# Patient Record
Sex: FEMALE | Race: WHITE | NOT HISPANIC OR LATINO | Employment: OTHER | URBAN - NONMETROPOLITAN AREA
[De-identification: names, ages, dates, MRNs, and addresses within clinical notes are randomized per-mention and may not be internally consistent; named-entity substitution may affect disease eponyms.]

---

## 2017-02-01 DIAGNOSIS — E78.5 HYPERLIPEMIA: ICD-10-CM

## 2017-02-01 RX ORDER — ATORVASTATIN CALCIUM 40 MG/1
TABLET, FILM COATED ORAL
Qty: 90 TABLET | Refills: 0 | Status: SHIPPED | OUTPATIENT
Start: 2017-02-01 | End: 2017-02-21 | Stop reason: ALTCHOICE

## 2017-02-03 ENCOUNTER — OFFICE VISIT (OUTPATIENT)
Dept: CARDIOLOGY | Facility: CLINIC | Age: 57
End: 2017-02-03

## 2017-02-03 VITALS
HEART RATE: 78 BPM | BODY MASS INDEX: 27.52 KG/M2 | WEIGHT: 161.2 LBS | SYSTOLIC BLOOD PRESSURE: 132 MMHG | HEIGHT: 64 IN | DIASTOLIC BLOOD PRESSURE: 68 MMHG

## 2017-02-03 DIAGNOSIS — R00.2 PALPITATIONS: ICD-10-CM

## 2017-02-03 DIAGNOSIS — I25.10 CORONARY ARTERY DISEASE INVOLVING NATIVE CORONARY ARTERY OF NATIVE HEART WITHOUT ANGINA PECTORIS: Primary | ICD-10-CM

## 2017-02-03 DIAGNOSIS — Z72.0 TOBACCO ABUSE: ICD-10-CM

## 2017-02-03 DIAGNOSIS — E78.5 HYPERLIPIDEMIA LDL GOAL <70: ICD-10-CM

## 2017-02-03 DIAGNOSIS — R06.09 DYSPNEA ON EXERTION: ICD-10-CM

## 2017-02-03 PROCEDURE — 99213 OFFICE O/P EST LOW 20 MIN: CPT | Performed by: NURSE PRACTITIONER

## 2017-02-03 RX ORDER — PREGABALIN 200 MG/1
200 CAPSULE ORAL DAILY
COMMUNITY

## 2017-02-03 RX ORDER — HYDROCODONE BITARTRATE AND ACETAMINOPHEN 10; 325 MG/1; MG/1
1 TABLET ORAL EVERY 4 HOURS PRN
COMMUNITY

## 2017-02-03 RX ORDER — ASPIRIN 81 MG/1
81 TABLET ORAL DAILY
COMMUNITY
End: 2021-03-24 | Stop reason: SDUPTHER

## 2017-02-03 NOTE — ASSESSMENT & PLAN NOTE
· Schedule echocardiogram at [facility]  · Stop all caffeine use  · If echocardiogram is normal and patient still exhibiting symptoms recommend low-dose beta blocker.
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
## 2017-02-17 ENCOUNTER — LAB (OUTPATIENT)
Dept: LAB | Facility: HOSPITAL | Age: 57
End: 2017-02-17

## 2017-02-17 ENCOUNTER — OFFICE VISIT (OUTPATIENT)
Dept: CARDIOLOGY | Facility: CLINIC | Age: 57
End: 2017-02-17

## 2017-02-17 ENCOUNTER — HOSPITAL ENCOUNTER (OUTPATIENT)
Dept: CARDIOLOGY | Facility: HOSPITAL | Age: 57
Discharge: HOME | End: 2017-02-17
Admitting: NURSE PRACTITIONER

## 2017-02-17 VITALS
HEART RATE: 74 BPM | SYSTOLIC BLOOD PRESSURE: 124 MMHG | HEIGHT: 64 IN | BODY MASS INDEX: 26.98 KG/M2 | WEIGHT: 158 LBS | DIASTOLIC BLOOD PRESSURE: 80 MMHG

## 2017-02-17 DIAGNOSIS — E78.5 HYPERLIPEMIA: ICD-10-CM

## 2017-02-17 DIAGNOSIS — Z72.0 TOBACCO ABUSE: ICD-10-CM

## 2017-02-17 DIAGNOSIS — R00.2 PALPITATIONS: ICD-10-CM

## 2017-02-17 DIAGNOSIS — E78.5 HYPERLIPIDEMIA LDL GOAL <70: ICD-10-CM

## 2017-02-17 DIAGNOSIS — R06.09 DYSPNEA ON EXERTION: ICD-10-CM

## 2017-02-17 DIAGNOSIS — I25.10 CORONARY ARTERY DISEASE INVOLVING NATIVE CORONARY ARTERY OF NATIVE HEART WITHOUT ANGINA PECTORIS: Primary | ICD-10-CM

## 2017-02-17 LAB
ALBUMIN SERPL-MCNC: 4.5 G/DL (ref 3.2–4.8)
ALBUMIN/GLOB SERPL: 1.4 G/DL (ref 1.5–2.5)
ALP SERPL-CCNC: 82 U/L (ref 25–100)
ALT SERPL W P-5'-P-CCNC: 18 U/L (ref 7–40)
ANION GAP SERPL CALCULATED.3IONS-SCNC: 6 MMOL/L (ref 3–11)
ARTICHOKE IGE QN: 142 MG/DL (ref 0–130)
AST SERPL-CCNC: 23 U/L (ref 0–33)
BH CV ECHO MEAS - AO ROOT AREA (BSA CORRECTED): 1.6
BH CV ECHO MEAS - AO ROOT AREA: 6.6 CM^2
BH CV ECHO MEAS - AO ROOT DIAM: 2.9 CM
BH CV ECHO MEAS - BSA(HAYCOCK): 1.8 M^2
BH CV ECHO MEAS - BSA: 1.8 M^2
BH CV ECHO MEAS - BZI_BMI: 27.5 KILOGRAMS/M^2
BH CV ECHO MEAS - BZI_METRIC_HEIGHT: 162.6 CM
BH CV ECHO MEAS - BZI_METRIC_WEIGHT: 72.6 KG
BH CV ECHO MEAS - CONTRAST EF 4CH: 51.4 ML/M^2
BH CV ECHO MEAS - EDV(CUBED): 82.3 ML
BH CV ECHO MEAS - EDV(MOD-SP4): 74 ML
BH CV ECHO MEAS - EDV(TEICH): 85.4 ML
BH CV ECHO MEAS - EF(CUBED): 68.2 %
BH CV ECHO MEAS - EF(MOD-SP4): 51.4 %
BH CV ECHO MEAS - EF(TEICH): 60 %
BH CV ECHO MEAS - ESV(CUBED): 26.2 ML
BH CV ECHO MEAS - ESV(MOD-SP4): 36 ML
BH CV ECHO MEAS - ESV(TEICH): 34.2 ML
BH CV ECHO MEAS - FS: 31.7 %
BH CV ECHO MEAS - IVS/LVPW: 1
BH CV ECHO MEAS - IVSD: 1 CM
BH CV ECHO MEAS - LA DIMENSION: 3.3 CM
BH CV ECHO MEAS - LA/AO: 1.1
BH CV ECHO MEAS - LV DIASTOLIC VOL/BSA (35-75): 41.6 ML/M^2
BH CV ECHO MEAS - LV MASS(C)D: 151.2 GRAMS
BH CV ECHO MEAS - LV MASS(C)DI: 85 GRAMS/M^2
BH CV ECHO MEAS - LV MAX PG: 3.8 MMHG
BH CV ECHO MEAS - LV MEAN PG: 2 MMHG
BH CV ECHO MEAS - LV SYSTOLIC VOL/BSA (12-30): 20.2 ML/M^2
BH CV ECHO MEAS - LV V1 MAX: 97 CM/SEC
BH CV ECHO MEAS - LV V1 MEAN: 55.9 CM/SEC
BH CV ECHO MEAS - LV V1 VTI: 20.5 CM
BH CV ECHO MEAS - LVIDD: 4.4 CM
BH CV ECHO MEAS - LVIDS: 3 CM
BH CV ECHO MEAS - LVLD AP4: 7.1 CM
BH CV ECHO MEAS - LVLS AP4: 5.8 CM
BH CV ECHO MEAS - LVPWD: 1 CM
BH CV ECHO MEAS - PA ACC SLOPE: 779 CM/SEC^2
BH CV ECHO MEAS - PA ACC TIME: 0.11 SEC
BH CV ECHO MEAS - PA PR(ACCEL): 29.1 MMHG
BH CV ECHO MEAS - PI END-D VEL: 0.49 CM/SEC
BH CV ECHO MEAS - RAP SYSTOLE: 8 MMHG
BH CV ECHO MEAS - RVDD: 2.9 CM
BH CV ECHO MEAS - RVSP: 33.8 MMHG
BH CV ECHO MEAS - SI(CUBED): 31.5 ML/M^2
BH CV ECHO MEAS - SI(MOD-SP4): 21.4 ML/M^2
BH CV ECHO MEAS - SI(TEICH): 28.8 ML/M^2
BH CV ECHO MEAS - SV(CUBED): 56.1 ML
BH CV ECHO MEAS - SV(MOD-SP4): 38 ML
BH CV ECHO MEAS - SV(TEICH): 51.2 ML
BH CV ECHO MEAS - TR MAX VEL: 254 CM/SEC
BILIRUB SERPL-MCNC: 0.4 MG/DL (ref 0.3–1.2)
BUN BLD-MCNC: 8 MG/DL (ref 9–23)
BUN/CREAT SERPL: 11.4 (ref 7–25)
CALCIUM SPEC-SCNC: 10.1 MG/DL (ref 8.7–10.4)
CHLORIDE SERPL-SCNC: 101 MMOL/L (ref 99–109)
CHOLEST SERPL-MCNC: 196 MG/DL (ref 0–200)
CO2 SERPL-SCNC: 30 MMOL/L (ref 20–31)
CREAT BLD-MCNC: 0.7 MG/DL (ref 0.6–1.3)
DEPRECATED FTI SERPL-MCNC: 3.4 TBI
DEPRECATED RDW RBC AUTO: 43.2 FL (ref 37–54)
ERYTHROCYTE [DISTWIDTH] IN BLOOD BY AUTOMATED COUNT: 12.9 % (ref 11.3–14.5)
GFR SERPL CREATININE-BSD FRML MDRD: 87 ML/MIN/1.73
GLOBULIN UR ELPH-MCNC: 3.2 GM/DL
GLUCOSE BLD-MCNC: 86 MG/DL (ref 70–100)
HCT VFR BLD AUTO: 43.2 % (ref 34.5–44)
HDLC SERPL-MCNC: 46 MG/DL (ref 40–60)
HGB BLD-MCNC: 14.6 G/DL (ref 11.5–15.5)
LV EF 2D ECHO EST: 60 %
MCH RBC QN AUTO: 31.3 PG (ref 27–31)
MCHC RBC AUTO-ENTMCNC: 33.8 G/DL (ref 32–36)
MCV RBC AUTO: 92.5 FL (ref 80–99)
PLATELET # BLD AUTO: 278 10*3/MM3 (ref 150–450)
PMV BLD AUTO: 9.8 FL (ref 6–12)
POTASSIUM BLD-SCNC: 4.1 MMOL/L (ref 3.5–5.5)
PROT SERPL-MCNC: 7.7 G/DL (ref 5.7–8.2)
RBC # BLD AUTO: 4.67 10*6/MM3 (ref 3.89–5.14)
SODIUM BLD-SCNC: 137 MMOL/L (ref 132–146)
T3RU NFR SERPL: 29.1 % (ref 23–37)
T4 SERPL-MCNC: 11.7 MCG/DL (ref 4.7–11.4)
TRIGL SERPL-MCNC: 151 MG/DL (ref 0–150)
TSH SERPL DL<=0.05 MIU/L-ACNC: 2.46 MIU/ML (ref 0.35–5.35)
WBC NRBC COR # BLD: 7.69 10*3/MM3 (ref 3.5–10.8)

## 2017-02-17 PROCEDURE — 84436 ASSAY OF TOTAL THYROXINE: CPT | Performed by: INTERNAL MEDICINE

## 2017-02-17 PROCEDURE — 93306 TTE W/DOPPLER COMPLETE: CPT | Performed by: INTERNAL MEDICINE

## 2017-02-17 PROCEDURE — 0296T PR EXT ECG > 48HR TO 21 DAY RCRD W/CONECT INTL RCRD: CPT | Performed by: INTERNAL MEDICINE

## 2017-02-17 PROCEDURE — 80053 COMPREHEN METABOLIC PANEL: CPT | Performed by: INTERNAL MEDICINE

## 2017-02-17 PROCEDURE — 84443 ASSAY THYROID STIM HORMONE: CPT | Performed by: INTERNAL MEDICINE

## 2017-02-17 PROCEDURE — 93306 TTE W/DOPPLER COMPLETE: CPT

## 2017-02-17 PROCEDURE — 85027 COMPLETE CBC AUTOMATED: CPT | Performed by: INTERNAL MEDICINE

## 2017-02-17 PROCEDURE — 99214 OFFICE O/P EST MOD 30 MIN: CPT | Performed by: INTERNAL MEDICINE

## 2017-02-17 PROCEDURE — 80061 LIPID PANEL: CPT | Performed by: INTERNAL MEDICINE

## 2017-02-17 PROCEDURE — 36415 COLL VENOUS BLD VENIPUNCTURE: CPT

## 2017-02-17 PROCEDURE — 84479 ASSAY OF THYROID (T3 OR T4): CPT | Performed by: INTERNAL MEDICINE

## 2017-02-17 NOTE — ASSESSMENT & PLAN NOTE
Palpitations with a structurally normal heart are likely benign.  The patient continues to have episodes of tachycardia palpitations which caused her significant shortness of breath and near-syncope.  This occurs every 4-6 days and therefore extended monitoring will be needed to capture one of these episodes.      · 14 day Holter monitor  · TSH, CBC, CMP today

## 2017-02-17 NOTE — PROGRESS NOTES
"Encounter Date:02/17/2017    Patient ID: Damaris Severino is a 56 y.o. female who resides in [place], [place].    CC/Reason for visit:  Shortness of Breath (2 week follow up with echo); Coronary Artery Disease; and Palpitations          Damaris eSverino presents today as a 2 week follow up with an echo. Patient had been experiencing increased shortness of breath. She has a history of coronary artery disease, hyperlipidemia, and palpitations. Since last visit, patient has been feeling well overall. She does still get some episodes of palpitations, with the last one occurring this morning. She notes it lasted a few minutes. She also notes getting spells of \"knocking\" on her chest that causes her to get short of breath, almost to the point of syncope.    Review of Systems   Constitution: Positive for diaphoresis, weakness, malaise/fatigue and weight gain.   HENT: Positive for headaches and tinnitus.    Eyes: Positive for blurred vision. Negative for vision loss in left eye and vision loss in right eye.   Cardiovascular: Positive for irregular heartbeat and orthopnea. Negative for chest pain, dyspnea on exertion, near-syncope, palpitations, paroxysmal nocturnal dyspnea and syncope.   Respiratory: Positive for shortness of breath.    Hematologic/Lymphatic:        Swollen lymph glands   Skin: Positive for dry skin.   Musculoskeletal: Positive for back pain and muscle cramps. Negative for myalgias.   Gastrointestinal: Positive for constipation.   Neurological: Positive for excessive daytime sleepiness and dizziness. Negative for brief paralysis, focal weakness, numbness and paresthesias.   All other systems reviewed and are negative.      The patient's past medical, social, and family history reviewed in the patient's electronic medical record.    Allergies  Review of patient's allergies indicates no known allergies.    Outpatient Prescriptions Marked as Taking for the 2/17/17 encounter (Office Visit) with Heath Sandoval MD " "  Medication Sig Dispense Refill   • aspirin 81 MG EC tablet Take 81 mg by mouth Daily.     • atorvastatin (LIPITOR) 40 MG tablet TAKE 1 TABLET DAILY (NEED LAB WORK FOR ADDITIONAL REFILLS) 90 tablet 0   • HYDROcodone-acetaminophen (NORCO)  MG per tablet Take 1 tablet by mouth Every 4 (Four) Hours As Needed for moderate pain (4-6).     • Morphine Sulfate (MS CONTIN PO) Take 75 mg by mouth 2 (Two) Times a Day.     • pregabalin (LYRICA) 200 MG capsule Take 200 mg by mouth Daily.           Blood pressure 124/80, pulse 74, height 64\" (162.6 cm), weight 158 lb (71.7 kg).  Body mass index is 27.12 kg/(m^2).    Physical Exam   Constitutional: She is oriented to person, place, and time. She appears well-developed and well-nourished.   HENT:   Head: Normocephalic and atraumatic.   Eyes: Pupils are equal, round, and reactive to light. No scleral icterus.   Neck: No JVD present. Carotid bruit is not present. No thyromegaly present.   Cardiovascular: Normal rate, regular rhythm, S1 normal and S2 normal.  Exam reveals no gallop.    No murmur heard.  Pulmonary/Chest: Effort normal and breath sounds normal.   Abdominal: Soft. There is no tenderness.   Neurological: She is alert and oriented to person, place, and time.   Skin: Skin is warm and dry. No cyanosis. Nails show no clubbing.   Psychiatric: She has a normal mood and affect. Her behavior is normal.       Data Review:     Lab Results   Component Value Date    CHLPL 205 (H) 05/11/2016    TRIG 149 05/11/2016    HDL 45 (L) 05/11/2016    AST 25 05/11/2016    ALT 20 05/11/2016     Lab Results   Component Value Date    GLUCOSE 88 05/11/2016    BUN 9 05/11/2016    CREATININE 0.67 05/11/2016    BCR 13.7 05/11/2016    CO2 32.5 (H) 05/11/2016    CALCIUM 10.0 05/11/2016    ALBUMIN 4.8 05/11/2016    LABIL2 1.5 05/11/2016    AST 25 05/11/2016    ALT 20 05/11/2016       Procedures         Problem List Items Addressed This Visit        Cardiology Problems    Palpitations    Overview "     · Holter monitor (06/2016): Sinus rhythm, rare PAC's         Current Assessment & Plan     Palpitations with a structurally normal heart are likely benign.  The patient continues to have episodes of tachycardia palpitations which caused her significant shortness of breath and near-syncope.  This occurs every 4-6 days and therefore extended monitoring will be needed to capture one of these episodes.      · 14 day Holter monitor  · TSH, CBC, CMP today         Relevant Orders    CBC (No Diff)    Thyroid Panel With TSH    Holter / Event ZIO Patch    Coronary artery disease involving native coronary artery of native heart without angina pectoris - Primary    Overview     · Cardiac catheterization by Dr. Ordonez (11/20/2003): BMS to OM1.  · Chest pain symptoms ultimately resolved with cholecystectomy.   · Recurrent atypical chest pain symptoms, Spring 2016.  · Nuclear stress test (06/29/2016): Low risk study with no evidence of ishemia         Current Assessment & Plan     · Asymptomatic  · Continue aspirin, statin therapy         Hyperlipidemia LDL goal <70    Overview     · High intensity statin therapy indicated given the presence of coronary artery disease         Current Assessment & Plan     · Continue Lipitor 40 mg daily  · Obtain CMP and lipid profile the day            Other    Tobacco abuse    Current Assessment & Plan     · Tobacco cessation recommended                    · 14 day Holter monitor  · CMP, lipids, TSH, CBC today  · We'll be in contact with patient regarding results of monitor  · Follow up in 8 months, or sooner if needed.    Heath Sandoval MD  2/17/2017    Scribed for Heath Sandoval MD by Barron Soto. 2/17/2017  12:44 PM

## 2017-02-21 ENCOUNTER — TELEPHONE (OUTPATIENT)
Dept: CARDIOLOGY | Facility: CLINIC | Age: 57
End: 2017-02-21

## 2017-02-21 DIAGNOSIS — E78.5 HYPERLIPIDEMIA LDL GOAL <70: Primary | ICD-10-CM

## 2017-02-21 RX ORDER — ROSUVASTATIN CALCIUM 40 MG/1
40 TABLET, COATED ORAL DAILY
Qty: 90 TABLET | Refills: 1 | Status: SHIPPED | OUTPATIENT
Start: 2017-02-21 | End: 2021-03-24

## 2017-02-21 NOTE — TELEPHONE ENCOUNTER
Called patient to go over her lab results. Her LDL was elevated. Patient verbalized understanding of starting Crestor 40 mg but would like to start in 1 month. She said she just filled her atorvastatin and would like to finish those up. Script sent. Lab orders mailed.

## 2017-03-14 ENCOUNTER — OFFICE VISIT (OUTPATIENT)
Dept: CARDIOLOGY | Facility: CLINIC | Age: 57
End: 2017-03-14

## 2017-03-14 DIAGNOSIS — R00.2 PALPITATIONS: ICD-10-CM

## 2017-03-14 PROCEDURE — 93272 ECG/REVIEW INTERPRET ONLY: CPT | Performed by: INTERNAL MEDICINE

## 2017-03-14 PROCEDURE — 0298T ZIO PATCH: CPT | Performed by: INTERNAL MEDICINE

## 2017-05-02 DIAGNOSIS — E78.5 HYPERLIPEMIA: ICD-10-CM

## 2017-05-02 RX ORDER — ATORVASTATIN CALCIUM 40 MG/1
TABLET, FILM COATED ORAL
Qty: 90 TABLET | OUTPATIENT
Start: 2017-05-02

## 2021-03-24 ENCOUNTER — CONSULT (OUTPATIENT)
Dept: CARDIOLOGY | Facility: CLINIC | Age: 61
End: 2021-03-24

## 2021-03-24 VITALS
WEIGHT: 162.4 LBS | DIASTOLIC BLOOD PRESSURE: 76 MMHG | BODY MASS INDEX: 27.72 KG/M2 | HEIGHT: 64 IN | HEART RATE: 76 BPM | SYSTOLIC BLOOD PRESSURE: 128 MMHG | OXYGEN SATURATION: 95 %

## 2021-03-24 DIAGNOSIS — I48.0 PAROXYSMAL ATRIAL FIBRILLATION (HCC): Primary | Chronic | ICD-10-CM

## 2021-03-24 DIAGNOSIS — E78.5 HYPERLIPIDEMIA LDL GOAL <70: ICD-10-CM

## 2021-03-24 DIAGNOSIS — I25.119 CORONARY ARTERY DISEASE INVOLVING NATIVE CORONARY ARTERY OF NATIVE HEART WITH ANGINA PECTORIS (HCC): Chronic | ICD-10-CM

## 2021-03-24 DIAGNOSIS — I10 ESSENTIAL HYPERTENSION: ICD-10-CM

## 2021-03-24 DIAGNOSIS — Z72.0 TOBACCO ABUSE: ICD-10-CM

## 2021-03-24 PROCEDURE — 93000 ELECTROCARDIOGRAM COMPLETE: CPT | Performed by: INTERNAL MEDICINE

## 2021-03-24 PROCEDURE — 99204 OFFICE O/P NEW MOD 45 MIN: CPT | Performed by: INTERNAL MEDICINE

## 2021-03-24 RX ORDER — DILTIAZEM HYDROCHLORIDE 120 MG/1
120 TABLET, EXTENDED RELEASE ORAL DAILY
COMMUNITY
Start: 2021-03-17 | End: 2021-03-24

## 2021-03-24 RX ORDER — MORPHINE SULFATE 15 MG/1
15 TABLET, FILM COATED, EXTENDED RELEASE ORAL 2 TIMES DAILY
COMMUNITY
Start: 2021-03-18

## 2021-03-24 RX ORDER — ATORVASTATIN CALCIUM 20 MG/1
40 TABLET, FILM COATED ORAL NIGHTLY
Qty: 90 TABLET | Refills: 1 | Status: SHIPPED | OUTPATIENT
Start: 2021-03-24 | End: 2021-04-29

## 2021-03-24 RX ORDER — PROPAFENONE HYDROCHLORIDE 300 MG/1
600 TABLET, COATED ORAL AS NEEDED
Qty: 30 TABLET | Refills: 0 | Status: SHIPPED | OUTPATIENT
Start: 2021-03-24 | End: 2021-04-15 | Stop reason: SDUPTHER

## 2021-03-24 RX ORDER — ASPIRIN 81 MG/1
81 TABLET ORAL DAILY
Qty: 90 TABLET | Refills: 3 | Status: SHIPPED | OUTPATIENT
Start: 2021-03-24 | End: 2022-05-25

## 2021-03-24 RX ORDER — AMLODIPINE BESYLATE 5 MG/1
5 TABLET ORAL DAILY
COMMUNITY
Start: 2021-03-01 | End: 2021-03-24

## 2021-03-24 RX ORDER — NITROGLYCERIN 0.4 MG/1
0.4 TABLET SUBLINGUAL
COMMUNITY
End: 2021-03-24 | Stop reason: SDUPTHER

## 2021-03-24 RX ORDER — CLOPIDOGREL BISULFATE 75 MG/1
75 TABLET ORAL DAILY
COMMUNITY
Start: 2021-03-01 | End: 2021-03-24

## 2021-03-24 RX ORDER — ATORVASTATIN CALCIUM 20 MG/1
20 TABLET, FILM COATED ORAL DAILY
COMMUNITY
Start: 2021-03-01 | End: 2021-03-24 | Stop reason: SDUPTHER

## 2021-03-24 RX ORDER — NITROGLYCERIN 0.4 MG/1
0.4 TABLET SUBLINGUAL
Qty: 25 TABLET | Refills: 5 | Status: SHIPPED | OUTPATIENT
Start: 2021-03-24

## 2021-03-24 RX ORDER — METOPROLOL SUCCINATE 50 MG/1
50 TABLET, EXTENDED RELEASE ORAL DAILY
Qty: 120 TABLET | Refills: 3 | Status: SHIPPED | OUTPATIENT
Start: 2021-03-24 | End: 2021-04-29

## 2021-03-24 RX ORDER — MULTIPLE VITAMINS W/ MINERALS TAB 9MG-400MCG
1 TAB ORAL DAILY
COMMUNITY

## 2021-03-24 NOTE — ASSESSMENT & PLAN NOTE
· Newly diagnosed and symptomatic atrial fibrillation  · Due to chads vas score 3, patient deserves anticoagulation.  We will start apixaban 5 mg twice daily  · Discontinue digoxin and start metoprolol succinate 50 mg daily.  Increase to 100 mg daily after 2 weeks  · Pill in the pocket propafenone will be prescribed as needed for recurrence of A. fib

## 2021-03-24 NOTE — ASSESSMENT & PLAN NOTE
· Discontinue amlodipine and diltiazem  · Start metoprolol succinate 50 mg daily and increase to 100 mg daily after 2 weeks

## 2021-03-24 NOTE — ASSESSMENT & PLAN NOTE
· Atypical chest pain symptoms, likely related to A. fib with RVR  · Restratify with pharmacologic nuclear stress  · Continue low-dose aspirin  · Start metoprolol succinate 50 mg daily
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
## 2021-04-01 ENCOUNTER — TELEPHONE (OUTPATIENT)
Dept: CARDIOLOGY | Facility: CLINIC | Age: 61
End: 2021-04-01

## 2021-04-01 NOTE — TELEPHONE ENCOUNTER
"Patient called to report that she feels terrible. She can barely do her ADLs d/t fatigue. She reports weakness, shortness of breath, and fatigue. HR 70's. Unsure of BP because she does not have a BP cuff but she just ordered one.    She was started on Eliquis and metoprolol 3/24/2021. She has taken propafenone every day for the last 3-4 days. She says it helps for a short time but then her heart, \"flip flops all over again\". Since she was seen, she reports that she feels worse. She says she can not wait for her scheduled echo and stress test 4/21/2021. Any med changes? Could she have a KISHA with cardioversion instead?  "

## 2021-04-02 ENCOUNTER — OFFICE VISIT (OUTPATIENT)
Dept: CARDIOLOGY | Facility: CLINIC | Age: 61
End: 2021-04-02

## 2021-04-02 ENCOUNTER — TELEPHONE (OUTPATIENT)
Dept: CARDIOLOGY | Facility: CLINIC | Age: 61
End: 2021-04-02

## 2021-04-02 DIAGNOSIS — I48.0 PAROXYSMAL ATRIAL FIBRILLATION (HCC): Primary | ICD-10-CM

## 2021-04-02 NOTE — TELEPHONE ENCOUNTER
"Patient came in for an EKG. SB, HR 57. EKG reviewed by [facility]. She still c/o her heart, \"flipping and flopping around in her chest through out the day\". Epatch placed # [id number] in clinic.   "

## 2021-04-15 DIAGNOSIS — I48.0 PAROXYSMAL ATRIAL FIBRILLATION (HCC): Chronic | ICD-10-CM

## 2021-04-15 RX ORDER — PROPAFENONE HYDROCHLORIDE 300 MG/1
600 TABLET, COATED ORAL AS NEEDED
Qty: 60 TABLET | Refills: 0 | Status: SHIPPED | OUTPATIENT
Start: 2021-04-15 | End: 2021-04-29 | Stop reason: SDUPTHER

## 2021-04-21 ENCOUNTER — HOSPITAL ENCOUNTER (OUTPATIENT)
Dept: CARDIOLOGY | Facility: HOSPITAL | Age: 61
Discharge: HOME | End: 2021-04-21

## 2021-04-21 VITALS — HEIGHT: 64 IN | WEIGHT: 162 LBS | BODY MASS INDEX: 27.66 KG/M2

## 2021-04-21 DIAGNOSIS — I48.0 PAROXYSMAL ATRIAL FIBRILLATION (HCC): ICD-10-CM

## 2021-04-21 DIAGNOSIS — I25.119 CORONARY ARTERY DISEASE INVOLVING NATIVE CORONARY ARTERY OF NATIVE HEART WITH ANGINA PECTORIS (HCC): ICD-10-CM

## 2021-04-21 LAB
BH CV ECHO MEAS - AO MAX PG (FULL): 2 MMHG
BH CV ECHO MEAS - AO MAX PG: 6 MMHG
BH CV ECHO MEAS - AO MEAN PG (FULL): 1 MMHG
BH CV ECHO MEAS - AO MEAN PG: 3 MMHG
BH CV ECHO MEAS - AO ROOT AREA (BSA CORRECTED): 1.8
BH CV ECHO MEAS - AO ROOT AREA: 8 CM^2
BH CV ECHO MEAS - AO ROOT DIAM: 3.2 CM
BH CV ECHO MEAS - AO V2 MAX: 118 CM/SEC
BH CV ECHO MEAS - AO V2 MEAN: 75.2 CM/SEC
BH CV ECHO MEAS - AO V2 VTI: 25.9 CM
BH CV ECHO MEAS - ASC AORTA: 2.6 CM
BH CV ECHO MEAS - AVA(I,A): 2.4 CM^2
BH CV ECHO MEAS - AVA(I,D): 2.4 CM^2
BH CV ECHO MEAS - AVA(V,A): 2.4 CM^2
BH CV ECHO MEAS - AVA(V,D): 2.4 CM^2
BH CV ECHO MEAS - BSA(HAYCOCK): 1.8 M^2
BH CV ECHO MEAS - BSA: 1.8 M^2
BH CV ECHO MEAS - BZI_BMI: 27.8 KILOGRAMS/M^2
BH CV ECHO MEAS - BZI_METRIC_HEIGHT: 162.6 CM
BH CV ECHO MEAS - BZI_METRIC_WEIGHT: 73.5 KG
BH CV ECHO MEAS - EDV(CUBED): 85.8 ML
BH CV ECHO MEAS - EDV(MOD-SP2): 57 ML
BH CV ECHO MEAS - EDV(MOD-SP4): 45 ML
BH CV ECHO MEAS - EDV(TEICH): 88.2 ML
BH CV ECHO MEAS - EF(CUBED): 65.3 %
BH CV ECHO MEAS - EF(MOD-BP): 56 %
BH CV ECHO MEAS - EF(MOD-SP2): 54.4 %
BH CV ECHO MEAS - EF(MOD-SP4): 60 %
BH CV ECHO MEAS - EF(TEICH): 57 %
BH CV ECHO MEAS - ESV(CUBED): 29.8 ML
BH CV ECHO MEAS - ESV(MOD-SP2): 26 ML
BH CV ECHO MEAS - ESV(MOD-SP4): 18 ML
BH CV ECHO MEAS - ESV(TEICH): 37.9 ML
BH CV ECHO MEAS - FS: 29.7 %
BH CV ECHO MEAS - IVS/LVPW: 0.89
BH CV ECHO MEAS - IVSD: 0.91 CM
BH CV ECHO MEAS - LA DIMENSION: 3.6 CM
BH CV ECHO MEAS - LA/AO: 1.1
BH CV ECHO MEAS - LAD MAJOR: 5 CM
BH CV ECHO MEAS - LAT PEAK E' VEL: 9.1 CM/SEC
BH CV ECHO MEAS - LATERAL E/E' RATIO: 11.5
BH CV ECHO MEAS - LV DIASTOLIC VOL/BSA (35-75): 25.2 ML/M^2
BH CV ECHO MEAS - LV MASS(C)D: 141 GRAMS
BH CV ECHO MEAS - LV MASS(C)DI: 78.8 GRAMS/M^2
BH CV ECHO MEAS - LV MAX PG: 4 MMHG
BH CV ECHO MEAS - LV MEAN PG: 2 MMHG
BH CV ECHO MEAS - LV SYSTOLIC VOL/BSA (12-30): 10.1 ML/M^2
BH CV ECHO MEAS - LV V1 MAX: 100 CM/SEC
BH CV ECHO MEAS - LV V1 MEAN: 56.8 CM/SEC
BH CV ECHO MEAS - LV V1 VTI: 21.6 CM
BH CV ECHO MEAS - LVIDD: 4.4 CM
BH CV ECHO MEAS - LVIDS: 3.1 CM
BH CV ECHO MEAS - LVLD AP2: 6.4 CM
BH CV ECHO MEAS - LVLD AP4: 6.4 CM
BH CV ECHO MEAS - LVLS AP2: 5.6 CM
BH CV ECHO MEAS - LVLS AP4: 5.2 CM
BH CV ECHO MEAS - LVOT AREA (M): 2.8 CM^2
BH CV ECHO MEAS - LVOT AREA: 2.8 CM^2
BH CV ECHO MEAS - LVOT DIAM: 1.9 CM
BH CV ECHO MEAS - LVPWD: 1 CM
BH CV ECHO MEAS - MED PEAK E' VEL: 8.1 CM/SEC
BH CV ECHO MEAS - MEDIAL E/E' RATIO: 13
BH CV ECHO MEAS - MV A MAX VEL: 54.3 CM/SEC
BH CV ECHO MEAS - MV DEC TIME: 0.16 SEC
BH CV ECHO MEAS - MV E MAX VEL: 105 CM/SEC
BH CV ECHO MEAS - MV E/A: 1.9
BH CV ECHO MEAS - PA ACC SLOPE: 597 CM/SEC^2
BH CV ECHO MEAS - PA ACC TIME: 0.12 SEC
BH CV ECHO MEAS - PA PR(ACCEL): 23.2 MMHG
BH CV ECHO MEAS - RAP SYSTOLE: 3 MMHG
BH CV ECHO MEAS - RVSP: 34.1 MMHG
BH CV ECHO MEAS - SI(AO): 116.5 ML/M^2
BH CV ECHO MEAS - SI(CUBED): 31.3 ML/M^2
BH CV ECHO MEAS - SI(LVOT): 34.2 ML/M^2
BH CV ECHO MEAS - SI(MOD-SP2): 17.3 ML/M^2
BH CV ECHO MEAS - SI(MOD-SP4): 15.1 ML/M^2
BH CV ECHO MEAS - SI(TEICH): 28.1 ML/M^2
BH CV ECHO MEAS - SV(AO): 208.3 ML
BH CV ECHO MEAS - SV(CUBED): 56 ML
BH CV ECHO MEAS - SV(LVOT): 61.2 ML
BH CV ECHO MEAS - SV(MOD-SP2): 31 ML
BH CV ECHO MEAS - SV(MOD-SP4): 27 ML
BH CV ECHO MEAS - SV(TEICH): 50.2 ML
BH CV ECHO MEAS - TAPSE (>1.6): 1.9 CM
BH CV ECHO MEAS - TR MAX PG: 31.1 MMHG
BH CV ECHO MEAS - TR MAX VEL: 279 CM/SEC
BH CV ECHO MEASUREMENTS AVERAGE E/E' RATIO: 12.21
BH CV XLRA - RV BASE: 3.7 CM
BH CV XLRA - RV LENGTH: 5 CM
BH CV XLRA - RV MID: 3 CM
LEFT ATRIUM VOLUME INDEX: 24 ML/M^2
LEFT ATRIUM VOLUME: 43 ML
LV EF 2D ECHO EST: 60 %

## 2021-04-21 PROCEDURE — 93306 TTE W/DOPPLER COMPLETE: CPT

## 2021-04-21 PROCEDURE — A9500 TC99M SESTAMIBI: HCPCS | Performed by: INTERNAL MEDICINE

## 2021-04-21 PROCEDURE — 93306 TTE W/DOPPLER COMPLETE: CPT | Performed by: INTERNAL MEDICINE

## 2021-04-21 PROCEDURE — 93017 CV STRESS TEST TRACING ONLY: CPT

## 2021-04-21 PROCEDURE — 0 TECHNETIUM SESTAMIBI: Performed by: INTERNAL MEDICINE

## 2021-04-21 PROCEDURE — 78452 HT MUSCLE IMAGE SPECT MULT: CPT

## 2021-04-21 PROCEDURE — 78452 HT MUSCLE IMAGE SPECT MULT: CPT | Performed by: INTERNAL MEDICINE

## 2021-04-21 PROCEDURE — 93018 CV STRESS TEST I&R ONLY: CPT | Performed by: INTERNAL MEDICINE

## 2021-04-21 PROCEDURE — 25010000002 REGADENOSON 0.4 MG/5ML SOLUTION: Performed by: INTERNAL MEDICINE

## 2021-04-21 RX ADMIN — TECHNETIUM TC 99M SESTAMIBI 1 DOSE: 1 INJECTION INTRAVENOUS at 11:16

## 2021-04-21 RX ADMIN — TECHNETIUM TC 99M SESTAMIBI 1 DOSE: 1 INJECTION INTRAVENOUS at 08:55

## 2021-04-21 RX ADMIN — REGADENOSON 0.4 MG: 0.08 INJECTION, SOLUTION INTRAVENOUS at 11:05

## 2021-04-22 LAB
BH CV REST NUCLEAR ISOTOPE DOSE: 9.6 MCI
BH CV STRESS BP STAGE 2: NORMAL
BH CV STRESS BP STAGE 4: NORMAL
BH CV STRESS COMMENTS STAGE 1: NORMAL
BH CV STRESS DOSE REGADENOSON STAGE 1: 0.4
BH CV STRESS DURATION MIN STAGE 1: 1
BH CV STRESS DURATION MIN STAGE 2: 1
BH CV STRESS DURATION MIN STAGE 3: 1
BH CV STRESS DURATION MIN STAGE 4: 1
BH CV STRESS DURATION SEC STAGE 1: 0
BH CV STRESS DURATION SEC STAGE 2: 0
BH CV STRESS DURATION SEC STAGE 3: 0
BH CV STRESS DURATION SEC STAGE 4: 0
BH CV STRESS HR STAGE 1: 59
BH CV STRESS HR STAGE 2: 66
BH CV STRESS HR STAGE 3: 62
BH CV STRESS HR STAGE 4: 61
BH CV STRESS NUCLEAR ISOTOPE DOSE: 30.7 MCI
BH CV STRESS PROTOCOL 1: NORMAL
BH CV STRESS RECOVERY BP: NORMAL MMHG
BH CV STRESS RECOVERY HR: 60 BPM
BH CV STRESS STAGE 1: 1
BH CV STRESS STAGE 2: 2
BH CV STRESS STAGE 3: 3
BH CV STRESS STAGE 4: 4
LV EF NUC BP: 67 %
MAXIMAL PREDICTED HEART RATE: 160 BPM
PERCENT MAX PREDICTED HR: 41.25 %
STRESS BASELINE BP: NORMAL MMHG
STRESS BASELINE HR: 56 BPM
STRESS O2 SAT REST: 95 %
STRESS PERCENT HR: 49 %
STRESS POST PEAK BP: NORMAL MMHG
STRESS POST PEAK HR: 66 BPM
STRESS TARGET HR: 136 BPM

## 2021-04-29 ENCOUNTER — OFFICE VISIT (OUTPATIENT)
Dept: CARDIOLOGY | Facility: CLINIC | Age: 61
End: 2021-04-29

## 2021-04-29 VITALS
WEIGHT: 161 LBS | OXYGEN SATURATION: 94 % | HEART RATE: 58 BPM | BODY MASS INDEX: 27.49 KG/M2 | HEIGHT: 64 IN | TEMPERATURE: 97.7 F | DIASTOLIC BLOOD PRESSURE: 76 MMHG | SYSTOLIC BLOOD PRESSURE: 144 MMHG

## 2021-04-29 DIAGNOSIS — E78.5 HYPERLIPIDEMIA LDL GOAL <70: ICD-10-CM

## 2021-04-29 DIAGNOSIS — I48.0 PAROXYSMAL ATRIAL FIBRILLATION (HCC): Chronic | ICD-10-CM

## 2021-04-29 DIAGNOSIS — I25.119 CORONARY ARTERY DISEASE INVOLVING NATIVE CORONARY ARTERY OF NATIVE HEART WITH ANGINA PECTORIS (HCC): Primary | Chronic | ICD-10-CM

## 2021-04-29 DIAGNOSIS — Z72.0 TOBACCO ABUSE: ICD-10-CM

## 2021-04-29 DIAGNOSIS — I10 ESSENTIAL HYPERTENSION: ICD-10-CM

## 2021-04-29 PROCEDURE — 99214 OFFICE O/P EST MOD 30 MIN: CPT | Performed by: INTERNAL MEDICINE

## 2021-04-29 RX ORDER — METOPROLOL SUCCINATE 50 MG/1
50 TABLET, EXTENDED RELEASE ORAL DAILY
COMMUNITY
End: 2021-04-29 | Stop reason: SDUPTHER

## 2021-04-29 RX ORDER — OMEPRAZOLE 20 MG/1
20 CAPSULE, DELAYED RELEASE ORAL DAILY
COMMUNITY
Start: 2021-04-28

## 2021-04-29 RX ORDER — METOPROLOL SUCCINATE 50 MG/1
50 TABLET, EXTENDED RELEASE ORAL DAILY
Qty: 90 TABLET | Refills: 3 | Status: SHIPPED | OUTPATIENT
Start: 2021-04-29 | End: 2021-08-03

## 2021-04-29 RX ORDER — PROPAFENONE HYDROCHLORIDE 300 MG/1
300 TABLET, COATED ORAL 2 TIMES DAILY
Qty: 180 TABLET | Refills: 1 | Status: SHIPPED | OUTPATIENT
Start: 2021-04-29 | End: 2021-08-20 | Stop reason: SDUPTHER

## 2021-04-29 RX ORDER — ATORVASTATIN CALCIUM 20 MG/1
20 TABLET, FILM COATED ORAL DAILY
COMMUNITY
End: 2021-04-29 | Stop reason: SDUPTHER

## 2021-04-29 RX ORDER — ATORVASTATIN CALCIUM 20 MG/1
20 TABLET, FILM COATED ORAL NIGHTLY
Start: 2021-04-29 | End: 2021-08-20 | Stop reason: SDUPTHER

## 2021-04-29 NOTE — ASSESSMENT & PLAN NOTE
· Recent stress test suggests no ischemia  · Continue low-dose aspirin, beta-blocker, and statin therapy

## 2021-04-29 NOTE — ASSESSMENT & PLAN NOTE
· Patient experiencing recurrent palpitation symptoms of A. fib  · Change propafenone dosing from as needed to 300 mg twice daily  · Continue apixaban  · If palpitation symptoms persist, patient will contact office and we will arrange 2-week Holter monitor.  If A. fib confirmed, will refer to EP.

## 2021-04-29 NOTE — PROGRESS NOTES
"[facility]      Identification: Damaris Severino is a 60 y.o. female who resides in [place], [place].    Reason for visit:  · Atrial fibrillation  · Coronary Artery Disease         Subjective      Damaris Severino presents to [facility] for followup.    History of Present Illness    Ms. Severino comes to the clinic today and states that she has been feeling weak and tired, and feels that her heart is going out of rhythm 2 to 3 days per week, 3 to 4 times per day, but usually does not last long. She reports that when her heart goes out of rhythm about once every 3 days, it stays out of rhythm and she gets very fatigued. She states that \"I smother really bad when it acts up.\" She is asking for an inhaler today.    She reports that she has slowed down in her smoking, but still smokes.    Review of Systems   Cardiovascular: Negative.    Respiratory: Negative.          Objective     /76 (BP Location: Right arm, Patient Position: Sitting)   Pulse 58   Temp 97.7 °F (36.5 °C)   Ht 162.6 cm (64\")   Wt 73 kg (161 lb)   SpO2 94%   BMI 27.64 kg/m²       Constitutional:       Appearance: Healthy appearance. Well-developed.   Eyes:      General: No scleral icterus.  HENT:      Head: Normocephalic and atraumatic.   Neck:      Vascular: No carotid bruit or JVD. JVD normal.   Pulmonary:      Effort: Pulmonary effort is normal.      Breath sounds: Normal breath sounds.   Cardiovascular:      Normal rate. Regular rhythm.      Murmurs: There is no murmur.      No gallop.   Musculoskeletal:      Extremities: No clubbing present.Skin:     General: Skin is warm and dry. There is no cyanosis.   Neurological:      Mental Status: Alert.   Psychiatric:         Attention and Perception: Attention normal.         Behavior: Behavior normal.         Result Review :    Lab date: 3/17/2021  • CMP: Glu 121, BUN 12, Creat 0.82, eGFR >60, Na 137, K 3.4, Cl 107, CO2 24, Ca 9.1, Alk Phos 87, AST 26, ALT 26  • CBC: WBC 12.2, " RBC 4.58, HGB 14.0, HCT 41.4, MCV 90.4, MCH 30.6,   · ProBNP 56  · Troponin <0.02, , CK-MB 4.5            Assessment     Problem List Items Addressed This Visit        Cardiology Problems    Paroxysmal atrial fibrillation (CMS/HCC) (Chronic)    Overview     · Holter monitor (06/2016): Sinus rhythm, rare PAC's  · Echocardiogram (2/17/2017): EF 60%. No significant valvular abnormalities.   · ER visit at [facility] for afib with RVR, 3/17/2021  · [id number] 2 (female, CAD, HTN)  · Echo (04/21/2021): LVEF 60%. No valvular abnormalities.   · Holter monitor (04/02/2021): 7 days. SVT longest 22 beats. Avg HR 60.         Current Assessment & Plan     · Patient experiencing recurrent palpitation symptoms of A. fib  · Change propafenone dosing from as needed to 300 mg twice daily  · Continue apixaban  · If palpitation symptoms persist, patient will contact office and we will arrange 2-week Holter monitor.  If A. fib confirmed, will refer to EP.         Relevant Medications    propafenone (RYTHMOL) 300 MG tablet    apixaban (ELIQUIS) 5 MG tablet tablet    metoprolol succinate XL (TOPROL-XL) 50 MG 24 hr tablet    Coronary artery disease involving native coronary artery of native heart with angina pectoris (CMS/HCC) - Primary (Chronic)    Overview     · Cardiac catheterization by Dr. Ordonez (11/20/2003): BMS to OM1.  · Chest pain symptoms ultimately resolved with cholecystectomy.   · Recurrent atypical chest pain symptoms, Spring 2016.  · Nuclear stress test (06/29/2016): Normal perfusion images. LVEF 65%.  · Nuclear stress test (04/21/2021): No evidence of ischemia. LVEF 67%.         Current Assessment & Plan     · Recent stress test suggests no ischemia  · Continue low-dose aspirin, beta-blocker, and statin therapy         Relevant Medications    metoprolol succinate XL (TOPROL-XL) 50 MG 24 hr tablet    Essential hypertension (Chronic)    Overview     • Target blood pressure <130/80 mmHg         Relevant  Medications    metoprolol succinate XL (TOPROL-XL) 50 MG 24 hr tablet    Hyperlipidemia LDL goal <70 (Chronic)    Overview     · High intensity statin therapy indicated given the presence of coronary artery disease         Current Assessment & Plan     · Continue atorvastatin  · Increase dose if LDL >70         Relevant Medications    atorvastatin (LIPITOR) 20 MG tablet       Other    Tobacco abuse    Current Assessment & Plan     · Smoking cessation recommended               Plan   • Change propafenone dosing to 300 mg twice daily  • Patient will contact office if symptoms persist.  At that point 2-week Holter monitor will be placed  • Smoking cessation recommended      Follow-up   Return in about 3 months (around 7/29/2021) for Follow-up with cardiology APRN/PA.        Greg Sandoval MD, [facility], [facility]  4/29/2021    Scribed for Heath Sandoval IV, MD by Ebonie Rodriguez.  04/29/21   12:05 EDT    I have personally performed the services described in this document as scribed by the above individual, and it is both accurate and complete.  Heath Sandoval IV, MD  4/29/2021  13:10 EDT

## 2021-07-07 ENCOUNTER — TELEPHONE (OUTPATIENT)
Dept: CARDIOLOGY | Facility: CLINIC | Age: 61
End: 2021-07-07

## 2021-07-07 DIAGNOSIS — I48.0 PAROXYSMAL ATRIAL FIBRILLATION (HCC): Primary | ICD-10-CM

## 2021-07-07 NOTE — TELEPHONE ENCOUNTER
Patient called to report that she believes she is having more episodes of afib. Your last note mentions a 2 week monitor. She had one 4/2021. Just confirming that you wanted another one? If so, did you want an event instead?

## 2021-07-08 NOTE — TELEPHONE ENCOUNTER
Patient would like to go to the [place] office to have it placed tomorrow. Message sent to holter department.

## 2021-08-03 ENCOUNTER — DOCUMENTATION (OUTPATIENT)
Dept: CARDIOLOGY | Facility: CLINIC | Age: 61
End: 2021-08-03

## 2021-08-03 ENCOUNTER — OFFICE VISIT (OUTPATIENT)
Dept: CARDIOLOGY | Facility: CLINIC | Age: 61
End: 2021-08-03

## 2021-08-03 VITALS
BODY MASS INDEX: 28.03 KG/M2 | WEIGHT: 164.2 LBS | HEIGHT: 64 IN | HEART RATE: 55 BPM | OXYGEN SATURATION: 98 % | DIASTOLIC BLOOD PRESSURE: 78 MMHG | SYSTOLIC BLOOD PRESSURE: 130 MMHG

## 2021-08-03 DIAGNOSIS — I10 ESSENTIAL HYPERTENSION: Chronic | ICD-10-CM

## 2021-08-03 DIAGNOSIS — I48.0 PAROXYSMAL ATRIAL FIBRILLATION (HCC): Primary | Chronic | ICD-10-CM

## 2021-08-03 DIAGNOSIS — I25.119 CORONARY ARTERY DISEASE INVOLVING NATIVE CORONARY ARTERY OF NATIVE HEART WITH ANGINA PECTORIS (HCC): Chronic | ICD-10-CM

## 2021-08-03 DIAGNOSIS — Z72.0 TOBACCO ABUSE: ICD-10-CM

## 2021-08-03 DIAGNOSIS — E78.5 HYPERLIPIDEMIA LDL GOAL <70: Chronic | ICD-10-CM

## 2021-08-03 PROBLEM — G47.33 OSA (OBSTRUCTIVE SLEEP APNEA): Status: ACTIVE | Noted: 2021-08-03

## 2021-08-03 PROCEDURE — 93000 ELECTROCARDIOGRAM COMPLETE: CPT | Performed by: NURSE PRACTITIONER

## 2021-08-03 PROCEDURE — 99214 OFFICE O/P EST MOD 30 MIN: CPT | Performed by: NURSE PRACTITIONER

## 2021-08-03 RX ORDER — IBUPROFEN 600 MG/1
600 TABLET ORAL 2 TIMES DAILY PRN
COMMUNITY
Start: 2021-06-16

## 2021-08-03 NOTE — ASSESSMENT & PLAN NOTE
· No signs or symptoms of angina  · Continue aspirin 81 mg daily  · Continue metoprolol succinate 50 mg daily  · Sublingual nitroglycerin as needed for episodes of angina.

## 2021-08-03 NOTE — PROGRESS NOTES
The patient contacted our office not understanding what her diagnosis is.  I spoke with her on the phone and told her we are seeing her for atrial fibrillation.  I tried to provide reassurance that atrial fibrillation is not a life-threatening emergency as she is on the right medications.  I tried to provide her with instructions on what to do for breakthrough episodes.  If her episodes are not terminated with an extra dose of propafenone she should go to the emergency room.  The patient expressed being upset as she feels like anything with her heart is a life-threatening emergency.  I told her I could have Dr. Sandoval call her and speak with her.  She tells me she would not like to see me anymore.  Then she hung up on me.  I will try to have Dr. Sandoval contact her by telephone.      KALE Elkins

## 2021-08-11 ENCOUNTER — TELEPHONE (OUTPATIENT)
Dept: CARDIOLOGY | Facility: CLINIC | Age: 61
End: 2021-08-11

## 2021-08-11 NOTE — TELEPHONE ENCOUNTER
Try to call home phone and cell phone.  No answer.  No answering machine.    AURA Sandoval MD [facility], [facility]  Interventional and General Cardiology

## 2021-08-11 NOTE — TELEPHONE ENCOUNTER
Patient is asking if you could call her? She would like to speak with you before she changes her medication after her appointment with Alesha last week.

## 2021-08-13 ENCOUNTER — TELEPHONE (OUTPATIENT)
Dept: CARDIOLOGY | Facility: CLINIC | Age: 61
End: 2021-08-13

## 2021-08-13 NOTE — TELEPHONE ENCOUNTER
Patient called requesting a call back. [facility] has called her multiple times and no answer. I attempted the number in the chart as the patient does not leave a phone number. No answer/no VM.

## 2021-08-19 DIAGNOSIS — I48.0 PAROXYSMAL ATRIAL FIBRILLATION (HCC): Primary | ICD-10-CM

## 2021-08-20 ENCOUNTER — TELEPHONE (OUTPATIENT)
Dept: CARDIOLOGY | Facility: CLINIC | Age: 61
End: 2021-08-20

## 2021-08-20 DIAGNOSIS — E78.5 HYPERLIPIDEMIA LDL GOAL <70: ICD-10-CM

## 2021-08-20 DIAGNOSIS — I48.0 PAROXYSMAL ATRIAL FIBRILLATION (HCC): Chronic | ICD-10-CM

## 2021-08-20 RX ORDER — PROPAFENONE HYDROCHLORIDE 300 MG/1
300 TABLET, COATED ORAL 2 TIMES DAILY
Qty: 180 TABLET | Refills: 1 | Status: SHIPPED | OUTPATIENT
Start: 2021-08-20 | End: 2021-08-30

## 2021-08-20 RX ORDER — ATORVASTATIN CALCIUM 20 MG/1
20 TABLET, FILM COATED ORAL NIGHTLY
Qty: 90 TABLET | Refills: 3 | Status: SHIPPED | OUTPATIENT
Start: 2021-08-20 | End: 2022-02-25

## 2021-08-20 RX ORDER — METOPROLOL SUCCINATE 50 MG/1
50 TABLET, EXTENDED RELEASE ORAL DAILY
Qty: 90 TABLET | Refills: 3 | Status: SHIPPED | OUTPATIENT
Start: 2021-08-20 | End: 2022-02-25 | Stop reason: SDUPTHER

## 2021-08-28 DIAGNOSIS — I48.0 PAROXYSMAL ATRIAL FIBRILLATION (HCC): Chronic | ICD-10-CM

## 2021-08-30 RX ORDER — PROPAFENONE HYDROCHLORIDE 300 MG/1
TABLET, COATED ORAL
Qty: 180 TABLET | Refills: 1 | Status: SHIPPED | OUTPATIENT
Start: 2021-08-30 | End: 2022-01-31

## 2022-01-31 DIAGNOSIS — I48.0 PAROXYSMAL ATRIAL FIBRILLATION: Chronic | ICD-10-CM

## 2022-01-31 RX ORDER — PROPAFENONE HYDROCHLORIDE 300 MG/1
TABLET, COATED ORAL
Qty: 180 TABLET | Refills: 1 | Status: SHIPPED | OUTPATIENT
Start: 2022-01-31 | End: 2022-02-25 | Stop reason: SDUPTHER

## 2022-02-24 NOTE — PROGRESS NOTES
"[facility]      Identification: Damaris Severino is a 61 y.o. female who resides in [place], [place]    Reason for visit:  · CAD  · CV risk factors      Subjective      Damaris Severino presents to [facility] for followup.    Patient returns the office for routine follow-up.  She states she is having palpitations which she thinks is related to atrial fibrillation about 3 or 3 times a month.  They bother her.  She has been taking propafenone 300 mg twice daily for rhythm control.  She denies any TIA or stroke symptoms.  She does complain of headache.            Review of Systems   Constitutional: Negative for malaise/fatigue.   Eyes: Negative for vision loss in left eye and vision loss in right eye.   Cardiovascular: Positive for palpitations. Negative for chest pain, dyspnea on exertion, near-syncope, orthopnea, paroxysmal nocturnal dyspnea and syncope.   Musculoskeletal: Negative for myalgias.   Neurological: Positive for headaches. Negative for brief paralysis, excessive daytime sleepiness, focal weakness, numbness, paresthesias and weakness.   All other systems reviewed and are negative.      Objective     /76 (BP Location: Right arm, Patient Position: Sitting)   Pulse 67   Ht 162.6 cm (64\")   Wt 73.9 kg (163 lb)   SpO2 95%   BMI 27.98 kg/m²       Constitutional:       Appearance: Healthy appearance. Well-developed.   Eyes:      General: Lids are normal. No scleral icterus.  HENT:      Head: Normocephalic and atraumatic.   Neck:      Thyroid: No thyroid mass.      Vascular: No carotid bruit or JVD. JVD normal.   Pulmonary:      Effort: Pulmonary effort is normal.      Breath sounds: Normal breath sounds.   Cardiovascular:      Normal rate. Regular rhythm.      Murmurs: There is no murmur.      No gallop.   Musculoskeletal:      Extremities: No clubbing present.Skin:     General: Skin is warm and dry. There is no cyanosis.   Neurological:      General: No focal deficit present. "      Mental Status: Alert.   Psychiatric:         Attention and Perception: Attention normal.         Behavior: Behavior normal. Behavior is cooperative.         Result Review :    Lab date: 8/17/2021  • LDL: 134  • CMP: Glu 97, BUN 8, Creat 0.70, eGFR >60, Na 137, K 4.7, Cl 101, CO2 29, Ca 10, Alk Phos 90, AST 19, ALT 12  • CBC: WBC 8.2, RBC 5.02, HGB 15.1, HCT 45.8, MCV 91.2, MCH 30.1,   • TSH: 2.200      ECG 12 Lead    Date/Time: 2/25/2022 3:16 PM  Performed by: Heath Sandoval IV, MD  Authorized by: Heath Sandoval IV, MD   Comparison: compared with previous ECG from 8/3/2021  Similar to previous ECG  Rhythm: sinus rhythm  BPM: 61               Assessment     Problem List Items Addressed This Visit        Cardiology Problems    Paroxysmal atrial fibrillation (HCC) - Primary (Chronic)    Overview     · Holter monitor (06/2016): Sinus rhythm, rare PAC's  · Echocardiogram (2/17/2017): EF 60%. No significant valvular abnormalities.   · ER visit at [facility] for afib with RVR, 3/17/2021  · [id number] 3 (female, CAD, HTN)  · Echo (04/21/2021): LVEF 60%. No valvular abnormalities.   · Holter monitor (04/02/2021): 7 days. SVT longest 22 beats. Avg HR 60.  · 16 day Holter monitor (7/2021): No atrial fibrillation.           Current Assessment & Plan     · Palpitations 2-3 times a month despite propafenone  · Obtain 14-day Holter monitor to determine if these symptoms are related to A. Fib  · To new propafenone for rhythm control  · Continue metoprolol  · Continue apixaban for stroke prophylaxis         Relevant Medications    propafenone (RYTHMOL) 300 MG tablet    Other Relevant Orders    ECG 12 Lead    Holter Monitor - 72 Hour Up To 15 Days    Coronary artery disease involving native coronary artery of native heart with angina pectoris (HCC) (Chronic)    Overview     · Cardiac catheterization by Dr. Ordonez (11/20/2003): BMS to OM1.  · Chest pain symptoms ultimately resolved with  cholecystectomy.   · Recurrent atypical chest pain symptoms, Spring 2016.  · Nuclear stress test (06/29/2016): Normal perfusion images. LVEF 65%.  · Nuclear stress test (04/21/2021): No evidence of ischemia. LVEF 67%.         Current Assessment & Plan     · No angina  · Continue low-dose aspirin, beta-blocker, statin therapy         Relevant Orders    ECG 12 Lead    Essential hypertension (Chronic)    Overview     • Target blood pressure <130/80 mmHg         Hyperlipidemia LDL goal <70 (Chronic)    Overview     · High intensity statin therapy indicated given the presence of coronary artery disease         Current Assessment & Plan     · Uncontrolled  · Change atorvastatin to rosuvastatin 20 mg nightly  · CMP and lipids in 6 weeks         Relevant Medications    rosuvastatin (CRESTOR) 20 MG tablet    Other Relevant Orders    Comprehensive Metabolic Panel    Lipid Panel       Other    Tobacco abuse          Plan   • 14-day Holter monitor  • Change atorvastatin to rosuvastatin 20 mg nightly  • CMP and lipids in 6 weeks  • Can cessation recommended      Follow-up   Return in about 6 months (around 8/25/2022).        Greg Sandoval MD, FACC, [facility]  2/25/2022

## 2022-02-25 ENCOUNTER — OFFICE VISIT (OUTPATIENT)
Dept: CARDIOLOGY | Facility: CLINIC | Age: 62
End: 2022-02-25

## 2022-02-25 VITALS
WEIGHT: 163 LBS | DIASTOLIC BLOOD PRESSURE: 76 MMHG | HEIGHT: 64 IN | BODY MASS INDEX: 27.83 KG/M2 | OXYGEN SATURATION: 95 % | HEART RATE: 67 BPM | SYSTOLIC BLOOD PRESSURE: 132 MMHG

## 2022-02-25 DIAGNOSIS — E78.5 HYPERLIPIDEMIA LDL GOAL <70: Chronic | ICD-10-CM

## 2022-02-25 DIAGNOSIS — I48.0 PAROXYSMAL ATRIAL FIBRILLATION: Chronic | ICD-10-CM

## 2022-02-25 DIAGNOSIS — I48.0 PAROXYSMAL ATRIAL FIBRILLATION: Primary | Chronic | ICD-10-CM

## 2022-02-25 DIAGNOSIS — I10 ESSENTIAL HYPERTENSION: Chronic | ICD-10-CM

## 2022-02-25 DIAGNOSIS — I25.119 CORONARY ARTERY DISEASE INVOLVING NATIVE CORONARY ARTERY OF NATIVE HEART WITH ANGINA PECTORIS: Chronic | ICD-10-CM

## 2022-02-25 DIAGNOSIS — Z72.0 TOBACCO ABUSE: ICD-10-CM

## 2022-02-25 PROCEDURE — 93000 ELECTROCARDIOGRAM COMPLETE: CPT | Performed by: INTERNAL MEDICINE

## 2022-02-25 PROCEDURE — 99214 OFFICE O/P EST MOD 30 MIN: CPT | Performed by: INTERNAL MEDICINE

## 2022-02-25 RX ORDER — PROPAFENONE HYDROCHLORIDE 300 MG/1
300 TABLET, COATED ORAL 2 TIMES DAILY
Qty: 180 TABLET | Refills: 1 | Status: SHIPPED | OUTPATIENT
Start: 2022-02-25

## 2022-02-25 RX ORDER — METOPROLOL SUCCINATE 50 MG/1
50 TABLET, EXTENDED RELEASE ORAL DAILY
Qty: 90 TABLET | Refills: 3 | Status: SHIPPED | OUTPATIENT
Start: 2022-02-25 | End: 2022-05-25

## 2022-02-25 RX ORDER — ROSUVASTATIN CALCIUM 20 MG/1
20 TABLET, COATED ORAL DAILY
Qty: 90 TABLET | Refills: 3 | Status: SHIPPED | OUTPATIENT
Start: 2022-02-25

## 2022-02-25 NOTE — ASSESSMENT & PLAN NOTE
· Palpitations 2-3 times a month despite propafenone  · Obtain 14-day Holter monitor to determine if these symptoms are related to A. Fib  · To new propafenone for rhythm control  · Continue metoprolol  · Continue apixaban for stroke prophylaxis

## 2022-05-18 ENCOUNTER — TELEPHONE (OUTPATIENT)
Dept: CARDIOLOGY | Facility: CLINIC | Age: 62
End: 2022-05-18

## 2022-05-18 NOTE — TELEPHONE ENCOUNTER
----- Message from Heath Sandoval IV, MD sent at 5/18/2022  9:40 AM EDT -----  Let her know that her Holter monitor shows episodes of atrial fibrillation.  The longest episode lasted 2 hours.  Her overall A. fib burden is 0.5%.  We can continue present treatment with pill in the pocket propafenone, or if the symptoms are bother  some enough for her we can start daily medications to suppress A. fib.

## 2022-05-25 DIAGNOSIS — I25.119 CORONARY ARTERY DISEASE INVOLVING NATIVE CORONARY ARTERY OF NATIVE HEART WITH ANGINA PECTORIS: Chronic | ICD-10-CM

## 2022-05-25 DIAGNOSIS — I48.0 PAROXYSMAL ATRIAL FIBRILLATION: Chronic | ICD-10-CM

## 2022-05-25 RX ORDER — METOPROLOL SUCCINATE 50 MG/1
50 TABLET, EXTENDED RELEASE ORAL DAILY
Qty: 90 TABLET | Refills: 3 | Status: SHIPPED | OUTPATIENT
Start: 2022-05-25

## 2022-05-25 RX ORDER — ASPIRIN 81 MG/1
TABLET, COATED ORAL
Qty: 90 TABLET | Refills: 3 | Status: SHIPPED | OUTPATIENT
Start: 2022-05-25